# Patient Record
Sex: FEMALE | Race: OTHER | HISPANIC OR LATINO | URBAN - METROPOLITAN AREA
[De-identification: names, ages, dates, MRNs, and addresses within clinical notes are randomized per-mention and may not be internally consistent; named-entity substitution may affect disease eponyms.]

---

## 2020-06-15 ENCOUNTER — OFFICE VISIT (OUTPATIENT)
Dept: URBAN - METROPOLITAN AREA CLINIC 92 | Facility: CLINIC | Age: 65
End: 2020-06-15

## 2020-06-19 ENCOUNTER — OFFICE VISIT (OUTPATIENT)
Dept: URBAN - METROPOLITAN AREA CLINIC 92 | Facility: CLINIC | Age: 65
End: 2020-06-19
Payer: MEDICARE

## 2020-06-19 ENCOUNTER — DASHBOARD ENCOUNTERS (OUTPATIENT)
Age: 65
End: 2020-06-19

## 2020-06-19 DIAGNOSIS — K31.84 GASTROPARESIS: ICD-10-CM

## 2020-06-19 DIAGNOSIS — K90.89 BILE SALT-INDUCED DIARRHEA: ICD-10-CM

## 2020-06-19 PROBLEM — 69980003: Status: ACTIVE | Noted: 2020-06-19

## 2020-06-19 PROCEDURE — G8427 DOCREV CUR MEDS BY ELIG CLIN: HCPCS | Performed by: INTERNAL MEDICINE

## 2020-06-19 PROCEDURE — 99213 OFFICE O/P EST LOW 20 MIN: CPT | Performed by: INTERNAL MEDICINE

## 2020-06-19 PROCEDURE — 1036F TOBACCO NON-USER: CPT | Performed by: INTERNAL MEDICINE

## 2020-06-19 NOTE — HPI-TODAY'S VISIT:
65-year-old female with history of gastroparesis diabeticorum with a bizarre diagnosis at EGD last year.  Was treated with erythromycin with excellent response. Also has history of chronic intermittent diarrhea following cholecystectomy and aggravated by metformin.  Was treated with WelChol with good response. Presently she is asymptomatic with no bloating or symptoms related to gastroparesis.  Having 1 normal bowel movement today.  She has not taken any medication for several months. Occasional abdominal discomfort when she takes acidic food. Recovering from right rotator cuff surgery performed in March. Diabetes well controlled on oral medication and also empiric injections.

## 2020-06-19 NOTE — EXAM-PHYSICAL EXAM
Examination head ear nose and throat unremarkable chest is clear heart is regular no murmurs no gallops abdomen soft no masses no tenderness active bowel sounds